# Patient Record
Sex: MALE | Race: ASIAN | NOT HISPANIC OR LATINO
[De-identification: names, ages, dates, MRNs, and addresses within clinical notes are randomized per-mention and may not be internally consistent; named-entity substitution may affect disease eponyms.]

---

## 2017-03-21 ENCOUNTER — APPOINTMENT (OUTPATIENT)
Dept: ORTHOPEDIC SURGERY | Facility: CLINIC | Age: 72
End: 2017-03-21

## 2017-08-01 ENCOUNTER — APPOINTMENT (OUTPATIENT)
Dept: ORTHOPEDIC SURGERY | Facility: CLINIC | Age: 72
End: 2017-08-01
Payer: MEDICARE

## 2017-08-01 PROCEDURE — 99214 OFFICE O/P EST MOD 30 MIN: CPT

## 2017-09-06 ENCOUNTER — OUTPATIENT (OUTPATIENT)
Dept: OUTPATIENT SERVICES | Facility: HOSPITAL | Age: 72
LOS: 1 days | End: 2017-09-06
Payer: MEDICARE

## 2017-09-06 VITALS
TEMPERATURE: 97 F | HEART RATE: 59 BPM | OXYGEN SATURATION: 97 % | WEIGHT: 151.9 LBS | RESPIRATION RATE: 16 BRPM | DIASTOLIC BLOOD PRESSURE: 70 MMHG | SYSTOLIC BLOOD PRESSURE: 120 MMHG | HEIGHT: 62.5 IN

## 2017-09-06 DIAGNOSIS — Z98.890 OTHER SPECIFIED POSTPROCEDURAL STATES: Chronic | ICD-10-CM

## 2017-09-06 DIAGNOSIS — C49.9 MALIGNANT NEOPLASM OF CONNECTIVE AND SOFT TISSUE, UNSPECIFIED: ICD-10-CM

## 2017-09-06 DIAGNOSIS — I10 ESSENTIAL (PRIMARY) HYPERTENSION: ICD-10-CM

## 2017-09-06 LAB
BLD GP AB SCN SERPL QL: NEGATIVE — SIGNIFICANT CHANGE UP
BUN SERPL-MCNC: 17 MG/DL — SIGNIFICANT CHANGE UP (ref 7–23)
CALCIUM SERPL-MCNC: 9.1 MG/DL — SIGNIFICANT CHANGE UP (ref 8.4–10.5)
CHLORIDE SERPL-SCNC: 106 MMOL/L — SIGNIFICANT CHANGE UP (ref 98–107)
CO2 SERPL-SCNC: 26 MMOL/L — SIGNIFICANT CHANGE UP (ref 22–31)
CREAT SERPL-MCNC: 0.85 MG/DL — SIGNIFICANT CHANGE UP (ref 0.5–1.3)
GLUCOSE SERPL-MCNC: 111 MG/DL — HIGH (ref 70–99)
HCT VFR BLD CALC: 41.3 % — SIGNIFICANT CHANGE UP (ref 39–50)
HGB BLD-MCNC: 13.7 G/DL — SIGNIFICANT CHANGE UP (ref 13–17)
MCHC RBC-ENTMCNC: 30.1 PG — SIGNIFICANT CHANGE UP (ref 27–34)
MCHC RBC-ENTMCNC: 33.2 % — SIGNIFICANT CHANGE UP (ref 32–36)
MCV RBC AUTO: 90.8 FL — SIGNIFICANT CHANGE UP (ref 80–100)
NRBC # FLD: 0 — SIGNIFICANT CHANGE UP
PLATELET # BLD AUTO: 188 K/UL — SIGNIFICANT CHANGE UP (ref 150–400)
PMV BLD: 11.2 FL — SIGNIFICANT CHANGE UP (ref 7–13)
POTASSIUM SERPL-MCNC: 3.6 MMOL/L — SIGNIFICANT CHANGE UP (ref 3.5–5.3)
POTASSIUM SERPL-SCNC: 3.6 MMOL/L — SIGNIFICANT CHANGE UP (ref 3.5–5.3)
RBC # BLD: 4.55 M/UL — SIGNIFICANT CHANGE UP (ref 4.2–5.8)
RBC # FLD: 12.6 % — SIGNIFICANT CHANGE UP (ref 10.3–14.5)
RH IG SCN BLD-IMP: POSITIVE — SIGNIFICANT CHANGE UP
SODIUM SERPL-SCNC: 144 MMOL/L — SIGNIFICANT CHANGE UP (ref 135–145)
WBC # BLD: 7.61 K/UL — SIGNIFICANT CHANGE UP (ref 3.8–10.5)
WBC # FLD AUTO: 7.61 K/UL — SIGNIFICANT CHANGE UP (ref 3.8–10.5)

## 2017-09-06 PROCEDURE — 93010 ELECTROCARDIOGRAM REPORT: CPT

## 2017-09-06 RX ORDER — SODIUM CHLORIDE 9 MG/ML
1000 INJECTION, SOLUTION INTRAVENOUS
Qty: 0 | Refills: 0 | Status: DISCONTINUED | OUTPATIENT
Start: 2017-09-13 | End: 2017-09-13

## 2017-09-06 RX ORDER — SODIUM CHLORIDE 9 MG/ML
3 INJECTION INTRAMUSCULAR; INTRAVENOUS; SUBCUTANEOUS EVERY 8 HOURS
Qty: 0 | Refills: 0 | Status: DISCONTINUED | OUTPATIENT
Start: 2017-09-13 | End: 2017-09-13

## 2017-09-06 NOTE — H&P PST ADULT - MUSCULOSKELETAL
No joint pain, swelling or deformity; no limitation of movement details… right knee/decreased ROM due to pain/joint swelling detailed exam

## 2017-09-06 NOTE — H&P PST ADULT - PROBLEM SELECTOR PLAN 1
Scheduled for Right Thigh Mass Resection on 9/13/2017.   Pre op instructions given to daughter, understanding verbalized   Chlorhexidine wash and GI prophylaxis provided  Pt was sen by PCP on 9/5 for medical clearance, note requested

## 2017-09-06 NOTE — H&P PST ADULT - HISTORY OF PRESENT ILLNESS
73 y/o male with PMH of HTN and HLD presents to PST for preoperative evaluation with diagnosis of recurrent low grade fibrosarcoma. As per daughter, patient has undergone 6 surgeries for tumor removal since 1998. Pt reports in June he developed right leg pain. Sent for MRI which revealed recurrence of tumor. Scheduled for Right Thigh Mass Resection on 9/13/2017.

## 2017-09-06 NOTE — H&P PST ADULT - NEGATIVE CARDIOVASCULAR SYMPTOMS
no chest pain/no paroxysmal nocturnal dyspnea/no palpitations/no dyspnea on exertion/no peripheral edema

## 2017-09-06 NOTE — H&P PST ADULT - ATTENDING COMMENTS
as written above  for elective resection right thigh mass  Risks, benefits and alternatives discussed with patient and family  Horace Gil MD  Musculoskeletal Oncology  [phone]

## 2017-09-06 NOTE — H&P PST ADULT - NEGATIVE NEUROLOGICAL SYMPTOMS
no focal seizures/no syncope/no headache/no confusion/no vertigo/no loss of sensation/no difficulty walking/no facial palsy/no hemiparesis/no weakness/no paresthesias/no generalized seizures/no transient paralysis

## 2017-09-06 NOTE — H&P PST ADULT - NEGATIVE GASTROINTESTINAL SYMPTOMS
no abdominal pain/no melena/no vomiting/no diarrhea/no change in bowel habits/no constipation/no nausea

## 2017-09-06 NOTE — H&P PST ADULT - RS GEN PE MLT RESP DETAILS PC
breath sounds equal/no wheezes/airway patent/good air movement/respirations non-labored/clear to auscultation bilaterally/no chest wall tenderness

## 2017-09-06 NOTE — H&P PST ADULT - PMH
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

## 2017-09-06 NOTE — H&P PST ADULT - NSANTHOSAYNRD_GEN_A_CORE
No. JAD screening performed.  STOP BANG Legend: 0-2 = LOW Risk; 3-4 = INTERMEDIATE Risk; 5-8 = HIGH Risk

## 2017-09-13 ENCOUNTER — RESULT REVIEW (OUTPATIENT)
Age: 72
End: 2017-09-13

## 2017-09-13 ENCOUNTER — OUTPATIENT (OUTPATIENT)
Dept: OUTPATIENT SERVICES | Facility: HOSPITAL | Age: 72
LOS: 1 days | Discharge: HOME | End: 2017-09-13
Payer: MEDICARE

## 2017-09-13 ENCOUNTER — APPOINTMENT (OUTPATIENT)
Dept: ORTHOPEDIC SURGERY | Facility: HOSPITAL | Age: 72
End: 2017-09-13

## 2017-09-13 VITALS
SYSTOLIC BLOOD PRESSURE: 154 MMHG | HEIGHT: 62.5 IN | HEART RATE: 58 BPM | TEMPERATURE: 97 F | WEIGHT: 151.9 LBS | RESPIRATION RATE: 15 BRPM | DIASTOLIC BLOOD PRESSURE: 83 MMHG | OXYGEN SATURATION: 98 %

## 2017-09-13 VITALS
HEART RATE: 64 BPM | SYSTOLIC BLOOD PRESSURE: 144 MMHG | OXYGEN SATURATION: 100 % | RESPIRATION RATE: 16 BRPM | DIASTOLIC BLOOD PRESSURE: 71 MMHG

## 2017-09-13 DIAGNOSIS — Z98.890 OTHER SPECIFIED POSTPROCEDURAL STATES: Chronic | ICD-10-CM

## 2017-09-13 DIAGNOSIS — C49.9 MALIGNANT NEOPLASM OF CONNECTIVE AND SOFT TISSUE, UNSPECIFIED: ICD-10-CM

## 2017-09-13 LAB — RH IG SCN BLD-IMP: POSITIVE — SIGNIFICANT CHANGE UP

## 2017-09-13 PROCEDURE — 88342 IMHCHEM/IMCYTCHM 1ST ANTB: CPT | Mod: 26

## 2017-09-13 PROCEDURE — 27364 RESECT THIGH/KNEE TUM 5 CM/>: CPT

## 2017-09-13 PROCEDURE — 88305 TISSUE EXAM BY PATHOLOGIST: CPT | Mod: 26

## 2017-09-13 PROCEDURE — 97605 NEG PRS WND THER DME<=50SQCM: CPT

## 2017-09-13 RX ORDER — VALSARTAN 80 MG/1
1 TABLET ORAL
Qty: 0 | Refills: 0 | COMMUNITY

## 2017-09-13 RX ORDER — ERGOCALCIFEROL 1.25 MG/1
1 CAPSULE ORAL
Qty: 0 | Refills: 0 | COMMUNITY

## 2017-09-13 RX ORDER — METOPROLOL TARTRATE 50 MG
1 TABLET ORAL
Qty: 0 | Refills: 0 | COMMUNITY

## 2017-09-13 RX ORDER — SENNA PLUS 8.6 MG/1
2 TABLET ORAL
Qty: 0 | Refills: 0 | COMMUNITY

## 2017-09-13 RX ORDER — FINASTERIDE 5 MG/1
1 TABLET, FILM COATED ORAL
Qty: 0 | Refills: 0 | COMMUNITY

## 2017-09-13 RX ORDER — SENNA PLUS 8.6 MG/1
1 TABLET ORAL
Qty: 0 | Refills: 0 | COMMUNITY

## 2017-09-13 RX ORDER — SODIUM CHLORIDE 9 MG/ML
1000 INJECTION, SOLUTION INTRAVENOUS
Qty: 0 | Refills: 0 | Status: DISCONTINUED | OUTPATIENT
Start: 2017-09-13 | End: 2017-09-28

## 2017-09-13 NOTE — ASU DISCHARGE PLAN (ADULT/PEDIATRIC). - NURSING INSTRUCTIONS
Do not take pain medication on an empty stomach.  Increase fluids and fiber in diet to prevent constipation. Do not take pain medication on an empty stomach.  Increase fluids and fiber in diet to prevent constipation.  You were given Tylenol in the OR.  Do not take Tylenol or products containing acetaminophen until 2:20pm.

## 2017-09-13 NOTE — ASU DISCHARGE PLAN (ADULT/PEDIATRIC). - POST OP PHONE #
pt. granted permission to leave message /and or speak with whoever answers the phone. [phone] daughter

## 2017-09-13 NOTE — ASU PREOP CHECKLIST - 1.
Mandarin interpeter [id number]   patient stated granddaughter / daughtyer may intepret Cecy Mandarin interpeter [id number]   patient stated granddaughter / dajose Soriano interpret

## 2017-09-13 NOTE — ASU DISCHARGE PLAN (ADULT/PEDIATRIC). - ACTIVITY LEVEL
no sports/gym/elevate extremity/no heavy lifting/weight bearing as tolerated elevate extremity/no sports/gym/weight bearing as tolerated/no heavy lifting/no tub baths

## 2017-09-13 NOTE — ASU DISCHARGE PLAN (ADULT/PEDIATRIC). - NOTIFY
Bleeding that does not stop/Pain not relieved by Medications/Numbness, color, or temperature change to extremity/Swelling that continues Numbness, color, or temperature change to extremity/Pain not relieved by Medications/Bleeding that does not stop/Fever greater than 101/Swelling that continues

## 2017-09-13 NOTE — ASU DISCHARGE PLAN (ADULT/PEDIATRIC). - MEDICATION SUMMARY - MEDICATIONS TO TAKE
I will START or STAY ON the medications listed below when I get home from the hospital:    finasteride 5 mg oral tablet  -- 1 tab(s) by mouth once a day (at bedtime)  -- Indication: For per pmd    oxyCODONE-acetaminophen 5 mg-325 mg oral tablet  -- 1 tab(s) by mouth every 6 hours MDD:6 As Needed Pain  -- Caution federal law prohibits the transfer of this drug to any person other  than the person for whom it was prescribed.  May cause drowsiness.  Alcohol may intensify this effect.  Use care when operating dangerous machinery.  This prescription cannot be refilled.  This product contains acetaminophen.  Do not use  with any other product containing acetaminophen to prevent possible liver damage.  Using more of this medication than prescribed may cause serious breathing problems.    -- Indication: For pain    valsartan 320 mg oral tablet  -- 1 tab(s) by mouth once a day (at bedtime)  -- Indication: For per pmd    atorvastatin 40 mg oral tablet  -- 1 tab(s) by mouth once a day in morning  -- Indication: For per pmd    metoprolol succinate 50 mg oral tablet, extended release  -- 1 tab(s) by mouth once a day in morning  -- Indication: For per pmd    senna oral tablet  --  2 tabs by mouth every other day at bedtime alternating with 1 tablet every other day at bedtime  -- Indication: For per pmd    senna oral tablet  -- 1 tab(s) by mouth every other day at bedtime alternating with 2 tablets every other day at bedtime  -- Indication: For per pmd    esomeprazole 40 mg oral delayed release capsule  -- 1 cap(s) by mouth once a day (at bedtime)  -- Indication: For per pdm    Calcium 600+D oral tablet  -- 1 tab(s) by mouth 2 times a day  -- Indication: For per pmd    Vitamin D2 50,000 intl units (1.25 mg) oral capsule  -- 1 cap(s) by mouth once a week on Wednesday morning  -- Indication: For per pmd

## 2017-09-14 ENCOUNTER — TRANSCRIPTION ENCOUNTER (OUTPATIENT)
Age: 72
End: 2017-09-14

## 2017-09-19 ENCOUNTER — APPOINTMENT (OUTPATIENT)
Dept: ORTHOPEDIC SURGERY | Facility: CLINIC | Age: 72
End: 2017-09-19
Payer: MEDICARE

## 2017-09-19 PROCEDURE — 99024 POSTOP FOLLOW-UP VISIT: CPT

## 2017-10-03 ENCOUNTER — APPOINTMENT (OUTPATIENT)
Dept: ORTHOPEDIC SURGERY | Facility: CLINIC | Age: 72
End: 2017-10-03
Payer: MEDICARE

## 2017-10-03 PROCEDURE — 99024 POSTOP FOLLOW-UP VISIT: CPT

## 2019-05-04 NOTE — H&P PST ADULT - NSANTHTIREDRD_ENT_A_CORE
Please call your hematologist/oncologist for a refill on your oxycodone.    Our goal in the emergency department is to always give you outstanding care and exceptional service. You may receive a survey by mail or e-mail in the next week regarding your experience in our ED. We would greatly appreciate your completing and returning the survey. Your feedback provides us with a way to recognize our staff who give very good care and it helps us learn how to improve when your experience was below our aspiration of excellence.    No

## 2019-08-08 NOTE — ASU PREOP CHECKLIST - HEIGHT IN CM
158.75
PAST SURGICAL HISTORY:  Bilateral cataracts     History of left knee replacement     History of partial hysterectomy     History of surgery bladder lift    History of tonsillectomy     S/P insertion of spinal cord stimulator

## 2021-12-09 NOTE — ASU PREOP CHECKLIST - SPO2 (%)
Impression: Combined forms of age-related cataract, bilateral: H25.813. Plan: Observe for now without intervention. The patient was advised to contact us if any change or worsening of vision. 98

## 2023-04-26 PROBLEM — E78.5 HYPERLIPIDEMIA, UNSPECIFIED: Chronic | Status: ACTIVE | Noted: 2017-09-06

## 2023-04-26 PROBLEM — I10 ESSENTIAL (PRIMARY) HYPERTENSION: Chronic | Status: ACTIVE | Noted: 2017-09-06

## 2023-04-26 PROBLEM — C49.9 MALIGNANT NEOPLASM OF CONNECTIVE AND SOFT TISSUE, UNSPECIFIED: Chronic | Status: ACTIVE | Noted: 2017-09-06

## 2023-05-09 ENCOUNTER — APPOINTMENT (OUTPATIENT)
Dept: ORTHOPEDIC SURGERY | Facility: CLINIC | Age: 78
End: 2023-05-09
Payer: MEDICARE

## 2023-05-09 DIAGNOSIS — M79.89 OTHER SPECIFIED SOFT TISSUE DISORDERS: ICD-10-CM

## 2023-05-09 PROCEDURE — 99213 OFFICE O/P EST LOW 20 MIN: CPT

## 2023-05-12 VITALS
HEIGHT: 62.99 IN | OXYGEN SATURATION: 95 % | TEMPERATURE: 98 F | SYSTOLIC BLOOD PRESSURE: 142 MMHG | WEIGHT: 149.91 LBS | HEART RATE: 72 BPM | RESPIRATION RATE: 16 BRPM | DIASTOLIC BLOOD PRESSURE: 66 MMHG

## 2023-05-12 PROBLEM — M79.89 MASS OF SOFT TISSUE OF THIGH: Status: ACTIVE | Noted: 2023-05-12

## 2023-05-12 NOTE — H&P ADULT - REASON FOR ADMISSION
cardiac cath

## 2023-05-12 NOTE — H&P ADULT - NSICDXPASTSURGICALHX_GEN_ALL_CORE_FT
PAST SURGICAL HISTORY:  H/O inguinal hernia repair right    S/P skin neoplasm resection of right thigh mass

## 2023-05-12 NOTE — H&P ADULT - NSHPLABSRESULTS_GEN_ALL_CORE
13.7   8.23  )-----------( 190      ( 22 May 2023 13:15 )             40.5       05-22    142  |  109<H>  |  20  ----------------------------<  81  3.6   |  23  |  0.90    Ca    8.7      22 May 2023 13:25    TPro  6.6  /  Alb  4.0  /  TBili  0.4  /  DBili  x   /  AST  12  /  ALT  9<L>  /  AlkPhos  66  05-22      PT/INR - ( 22 May 2023 13:15 )   PT: 11.4 sec;   INR: 0.96          PTT - ( 22 May 2023 13:15 )  PTT:30.1 sec    CARDIAC MARKERS ( 22 May 2023 13:25 )  x     / 0.01 ng/mL / 38 U/L / x     / <1.0 ng/mL            EKG: NSR, Frequent PVC's noted.

## 2023-05-12 NOTE — H&P ADULT - HISTORY OF PRESENT ILLNESS
Cardiologist: Dr. 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 Martel c/o gradually worsen intermittent substernal chest pressure, non-radiating with moderate physical exertion and relieved with rest. Denies CP, SOB, orthopnea/PND, LOC/dizziness, fever/chills, n/v/d, abdominal pain, LE edema. NST 3/13/2023: small perfusion abnormality of mild intensity is present in the inferior region. TTE 2/17/2023: EF 59%, mild LVH, aortic valve scleorsis with mild calcifications, mod AI, mild MR & TR.     In light of pt's risk factor, CCS angina class III symptoms, and abnormal NST result, pt was referred to the cardiac cath with possible intervention if clinically indicated.  Cardiologist: Dr. 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 Martel c/o gradually worsen intermittent substernal chest pressure, non-radiating with moderate physical exertion and relieved with rest. Denies CP, SOB, orthopnea/PND, LOC/dizziness, fever/chills, n/v/d, abdominal pain, LE edema. NST 3/13/2023: small perfusion abnormality of mild intensity is present in the inferior region. TTE 2/17/2023: EF 59%, mild LVH, aortic valve scleorsis with mild calcifications, mod AI, mild MR & TR.     In light of pt's risk factor, CCS angina class III symptoms, and abnormal NST result, pt was referred to the cardiac cath with possible intervention if clinically indicated.  Cardiologist: Dr. 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 Martel c/o gradually worsen intermittent substernal chest pressure, non-radiating with moderate physical exertion and relieved with rest. Denies CP, SOB, orthopnea/PND, LOC/dizziness, fever/chills, n/v/d, abdominal pain, LE edema. NST 3/13/2023: small perfusion abnormality of mild intensity is present in the inferior region. TTE 2/17/2023: EF 59%, mild LVH, aortic valve sclerosis with mild calcifications, mod AI, mild MR & TR.     In light of pt's risk factor, CCS angina class III symptoms, and abnormal NST result, pt was referred to the cardiac cath with possible intervention if clinically indicated.     OF NOTE: pt unaware of any meds. Called [facility] (name given by patient) who reported pt only on Senna 8.6mg qd, aspirin 81mg qd, Ranexa 500mg bid, Colase 100mg tid. Called second pharmacy [facility] in chart who reported all his other meds but not filled since 2017. Patient however did report he takes HTN meds. Will need to clarify if he uses any other pharmacy in addition.

## 2023-05-12 NOTE — HISTORY OF PRESENT ILLNESS
[FreeTextEntry1] : I have not seen the patient in over 5 years.  He has been feeling a mass and on imaging was found to have a new lesion.  He did not follow-up with me but has been following up with oncology.  He had a biopsy which shows recurrent disease.  He does not have any pain.  He is able to move around appropriately. [Stable] : stable [1] : currently ~his/her~ pain is 1 out of 10

## 2023-05-12 NOTE — H&P ADULT - ASSESSMENT
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 Martel c/o gradually worsen intermittent substernal chest pressure, non-radiating with moderate physical exertion and relieved with rest. Denies CP, SOB, orthopnea/PND, LOC/dizziness, fever/chills, n/v/d, abdominal pain, LE edema. NST 3/13/2023: small perfusion abnormality of mild intensity is present in the inferior region. TTE 2/17/2023: EF 59%, mild LVH, aortic valve sclerosis with mild calcifications, mod AI, mild MR & TR. Patient now presents for cardiac cath with possible intervention if clinically indicated.  in light of pt's risk factor, CCS angina class III symptoms, and abnormal NST result.    OF NOTE: pt unaware of any meds. Called "[facility] pharmacy (name given by patient) who reported pt only on Senna 8.6mg qd, aspirin 81mg qd, Ranexa 500mg bid, Colase 100mg tid. Called second pharmacy [facility] in chart who reported all his other meds but not filled since 2017. Patient however did report he takes HTN meds. Will need to clarify if he uses any other pharmacy in addition.     -Pt H+H, platelets stable, Pt without any reports of BRBPR, hematuria, prior ICH, melena and no recent or previous GI bleed.   -Loaded with: [ ]81mg ASA, [ ]75mg Plavix,  [ ] ASA 325mg [ ] Plavix 600mg, [x ] No Load [ ]. due to patient expecting potential recurrent femur cancer surgery on June 13th 2023.  -Pt Cr. stable- and LVEF 50%, pre cath fluids ordered per protocol [x]250ml IV bolus over 30 min, x[ ] 75cc x2hrs, [ ] 50cc x2hrs, Patient euvolemic on exam.   -Malampatti II  -ASA III    Pt is a Candidate for Moderate Sedation, yes     was used for language Mandarin ID [id number].     Risks & benefits of procedure and alternative therapy have been explained to the patient including but not limited to: allergic reaction, bleeding w/possible need for blood transfusion, infection, renal and vascular compromise, limb damage, arrhythmia, stroke, vessel dissection/perforation, Myocardial infarction, emergent CABG. Informed consent obtained and in chart

## 2023-05-12 NOTE — DATA REVIEWED
[Imaging Present] : Present [de-identified] : MRI of the knee April 5, 2023 shows recurrent tumor in the vastus medialis.  There is also questionable lesion in the distal femur.  I looked back on previous MRIs and the distal femur changes look the same as before and are consistent most likely with radiation changes.  He has an enlarged right inguinal lymph node and degenerative changes of the patellofemoral joint with a nondisplaced horizontal tear of the posterior horn the lateral meniscus\par \par Pathology from biopsy on April 26, 2023 shows recurrent low-grade myofibroblastic sarcoma\par \par PET/CT from April 28, 2023 shows soft tissue mass along the medial aspect of the distal right side 3.8 x 2.5 cm and additional focal area abutting the cortex of the right mid femur at most 1.7 cm

## 2023-05-12 NOTE — REASON FOR VISIT
[FreeTextEntry1] : 9/13/2017 - reresection of recurrent right thigh fibrosarcoma. \par 10/22/2012 - resection of recurrentfibrosarcoma \par Followup of recurrent fibrosarcoma

## 2023-05-12 NOTE — PHYSICAL EXAM
[FreeTextEntry1] : Unchanged from before. Scars on both the lateral and medial side of the RIGHT thigh. He has no new masses. There is good flexion and extension. [General Appearance - Well-Appearing] : Well appearing [General Appearance - Well Nourished] : well nourished [Sclera] : the sclera and conjunctiva were normal [Neck Cervical Mass (___cm)] : no neck mass was observed [Heart Rate And Rhythm] : heart rate was normal and rhythm regular [] : No respiratory distress [Abdomen Tenderness] : non-tender [Limping On The Right] : limping on the right [Normal Bilat. UE ROM:] : Full range of motion throughout the bilateral upper extremities. [Normal Bilat. UE Motor Strength] : 5/5 strength in bilateral EPL,FPL, EDC, FDS, FDP, HI, wrist extension, wrist flexion, supination, pronation, biceps, triceps and deltoids [Tenderness] : tenderness [Swelling] : no swelling [Erythema] : no erythema [Ecchymosis] : no ecchymosis [Masses] : no masses [Skin Changes - Describe changes:] : Skin changes noted: ~M [Incision Healed - Describe:] : Incision is healed ~M [Normal] : Sensation intact to light touch. [FreeTextEntry3] : Radiation-induced [FreeTextEntry5] : No inguinal nodes

## 2023-05-12 NOTE — DISCUSSION/SUMMARY
[All Questions Answered] : Patient (and family) had all questions answered to an agreeable level of satisfaction [Interested in Proceeding] : Patient (and family) expressed understanding and interest in proceeding with the plan as outlined [de-identified] : Patient's lesion is in the area of the old resection bed.  It is approximately 5 cm.  My recommendation would be for a wide resection of this once again.  We will also investigate the area near the femur.  At the same time we can consider doing a lymph node resection as well.  If this should be positive we would need to consider further work-up.  He understands the risk because of previous surgeries as well as radiation but there are cosmetic risks as well as wound healing risks.  We will likely put in a drain and a VAC type dressing.  I will see him for surgery in the near future.\par \par If imaging was ordered, the patient was told to make an appointment to review findings right after all imaging is completed.\par \par We discussed risks, benefits and alternatives. Rationale of care was reviewed and all questions were answered. Patient (and family) had all questions answered to her degree of the level of satisfaction. Patient (and family) expressed understanding and interest in proceeding with the plan as outlined.\par \par \par \par \par This note was done with a voice recognition transcription software and any typos are related to this rather than medical error. Surgical risks reviewed. Patient (and family) had all questions answered to an agreeable level of satisfaction. Patient (and family) expressed understanding and interest in proceeding with the plan as outlined.  \par

## 2023-05-12 NOTE — H&P ADULT - NSICDXPASTMEDICALHX_GEN_ALL_CORE_FT
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

## 2023-05-22 ENCOUNTER — OUTPATIENT (OUTPATIENT)
Dept: OUTPATIENT SERVICES | Facility: HOSPITAL | Age: 78
LOS: 1 days | Discharge: HOME | End: 2023-05-22
Payer: MEDICARE

## 2023-05-22 DIAGNOSIS — Z98.890 OTHER SPECIFIED POSTPROCEDURAL STATES: Chronic | ICD-10-CM

## 2023-05-22 LAB
A1C WITH ESTIMATED AVERAGE GLUCOSE RESULT: 5.2 % — SIGNIFICANT CHANGE UP (ref 4–5.6)
ALBUMIN SERPL ELPH-MCNC: 4 G/DL — SIGNIFICANT CHANGE UP (ref 3.3–5)
ALP SERPL-CCNC: 66 U/L — SIGNIFICANT CHANGE UP (ref 40–120)
ALT FLD-CCNC: 9 U/L — LOW (ref 10–45)
ANION GAP SERPL CALC-SCNC: 10 MMOL/L — SIGNIFICANT CHANGE UP (ref 5–17)
APTT BLD: 30.1 SEC — SIGNIFICANT CHANGE UP (ref 27.5–35.5)
AST SERPL-CCNC: 12 U/L — SIGNIFICANT CHANGE UP (ref 10–40)
BASE EXCESS BLDV CALC-SCNC: 2.4 MMOL/L — SIGNIFICANT CHANGE UP (ref -2–3)
BASOPHILS # BLD AUTO: 0.06 K/UL — SIGNIFICANT CHANGE UP (ref 0–0.2)
BASOPHILS NFR BLD AUTO: 0.7 % — SIGNIFICANT CHANGE UP (ref 0–2)
BILIRUB SERPL-MCNC: 0.4 MG/DL — SIGNIFICANT CHANGE UP (ref 0.2–1.2)
BUN SERPL-MCNC: 20 MG/DL — SIGNIFICANT CHANGE UP (ref 7–23)
CA-I SERPL-SCNC: 1.16 MMOL/L — SIGNIFICANT CHANGE UP (ref 1.15–1.33)
CALCIUM SERPL-MCNC: 8.7 MG/DL — SIGNIFICANT CHANGE UP (ref 8.4–10.5)
CHLORIDE SERPL-SCNC: 109 MMOL/L — HIGH (ref 96–108)
CHOLEST SERPL-MCNC: 233 MG/DL — HIGH
CK MB CFR SERPL CALC: <1 NG/ML — SIGNIFICANT CHANGE UP (ref 0–6.7)
CK SERPL-CCNC: 38 U/L — SIGNIFICANT CHANGE UP (ref 30–200)
CO2 BLDV-SCNC: 28.5 MMOL/L — HIGH (ref 22–26)
CO2 SERPL-SCNC: 23 MMOL/L — SIGNIFICANT CHANGE UP (ref 22–31)
COHGB MFR BLDV: 1.9 % — SIGNIFICANT CHANGE UP
CREAT SERPL-MCNC: 0.9 MG/DL — SIGNIFICANT CHANGE UP (ref 0.5–1.3)
EGFR: 86 ML/MIN/1.73M2 — SIGNIFICANT CHANGE UP
EOSINOPHIL # BLD AUTO: 0.24 K/UL — SIGNIFICANT CHANGE UP (ref 0–0.5)
EOSINOPHIL NFR BLD AUTO: 2.9 % — SIGNIFICANT CHANGE UP (ref 0–6)
ESTIMATED AVERAGE GLUCOSE: 103 MG/DL — SIGNIFICANT CHANGE UP (ref 68–114)
GAS PNL BLDV: 140 MMOL/L — SIGNIFICANT CHANGE UP (ref 136–145)
GLUCOSE BLDV-MCNC: 78 MG/DL — SIGNIFICANT CHANGE UP (ref 70–99)
GLUCOSE SERPL-MCNC: 81 MG/DL — SIGNIFICANT CHANGE UP (ref 70–99)
HCO3 BLDV-SCNC: 27 MMOL/L — SIGNIFICANT CHANGE UP (ref 22–29)
HCT VFR BLD CALC: 40.5 % — SIGNIFICANT CHANGE UP (ref 39–50)
HCT VFR BLDA CALC: 41 % — SIGNIFICANT CHANGE UP
HDLC SERPL-MCNC: 28 MG/DL — LOW
HGB BLD CALC-MCNC: 13.6 G/DL — SIGNIFICANT CHANGE UP (ref 12.6–17.4)
HGB BLD-MCNC: 13.7 G/DL — SIGNIFICANT CHANGE UP (ref 13–17)
IMM GRANULOCYTES NFR BLD AUTO: 0.7 % — SIGNIFICANT CHANGE UP (ref 0–0.9)
INR BLD: 0.96 — SIGNIFICANT CHANGE UP (ref 0.88–1.16)
LIPID PNL WITH DIRECT LDL SERPL: 152 MG/DL — HIGH
LYMPHOCYTES # BLD AUTO: 2.31 K/UL — SIGNIFICANT CHANGE UP (ref 1–3.3)
LYMPHOCYTES # BLD AUTO: 28.1 % — SIGNIFICANT CHANGE UP (ref 13–44)
MAGNESIUM SERPL-MCNC: 2.3 MG/DL — SIGNIFICANT CHANGE UP (ref 1.6–2.6)
MCHC RBC-ENTMCNC: 30.1 PG — SIGNIFICANT CHANGE UP (ref 27–34)
MCHC RBC-ENTMCNC: 33.8 GM/DL — SIGNIFICANT CHANGE UP (ref 32–36)
MCV RBC AUTO: 89 FL — SIGNIFICANT CHANGE UP (ref 80–100)
METHGB MFR BLDV: 0.8 % — SIGNIFICANT CHANGE UP
MONOCYTES # BLD AUTO: 0.8 K/UL — SIGNIFICANT CHANGE UP (ref 0–0.9)
MONOCYTES NFR BLD AUTO: 9.7 % — SIGNIFICANT CHANGE UP (ref 2–14)
NEUTROPHILS # BLD AUTO: 4.76 K/UL — SIGNIFICANT CHANGE UP (ref 1.8–7.4)
NEUTROPHILS NFR BLD AUTO: 57.9 % — SIGNIFICANT CHANGE UP (ref 43–77)
NON HDL CHOLESTEROL: 205 MG/DL — HIGH
NRBC # BLD: 0 /100 WBCS — SIGNIFICANT CHANGE UP (ref 0–0)
PCO2 BLDV: 42 MMHG — SIGNIFICANT CHANGE UP (ref 42–55)
PH BLDV: 7.42 — SIGNIFICANT CHANGE UP (ref 7.32–7.43)
PLATELET # BLD AUTO: 190 K/UL — SIGNIFICANT CHANGE UP (ref 150–400)
PO2 BLDV: 61 MMHG — HIGH (ref 25–45)
POTASSIUM BLDV-SCNC: 3.6 MMOL/L — SIGNIFICANT CHANGE UP (ref 3.5–5.1)
POTASSIUM SERPL-MCNC: 3.6 MMOL/L — SIGNIFICANT CHANGE UP (ref 3.5–5.3)
POTASSIUM SERPL-SCNC: 3.6 MMOL/L — SIGNIFICANT CHANGE UP (ref 3.5–5.3)
PROT SERPL-MCNC: 6.6 G/DL — SIGNIFICANT CHANGE UP (ref 6–8.3)
PROTHROM AB SERPL-ACNC: 11.4 SEC — SIGNIFICANT CHANGE UP (ref 10.5–13.4)
RBC # BLD: 4.55 M/UL — SIGNIFICANT CHANGE UP (ref 4.2–5.8)
RBC # FLD: 13.2 % — SIGNIFICANT CHANGE UP (ref 10.3–14.5)
SAO2 % BLDV: 92 % — HIGH (ref 67–88)
SODIUM SERPL-SCNC: 142 MMOL/L — SIGNIFICANT CHANGE UP (ref 135–145)
TRIGL SERPL-MCNC: 263 MG/DL — HIGH
TROPONIN T SERPL-MCNC: 0.01 NG/ML — SIGNIFICANT CHANGE UP (ref 0–0.01)
WBC # BLD: 8.23 K/UL — SIGNIFICANT CHANGE UP (ref 3.8–10.5)
WBC # FLD AUTO: 8.23 K/UL — SIGNIFICANT CHANGE UP (ref 3.8–10.5)

## 2023-05-22 PROCEDURE — 82550 ASSAY OF CK (CPK): CPT

## 2023-05-22 PROCEDURE — 85610 PROTHROMBIN TIME: CPT

## 2023-05-22 PROCEDURE — C1894: CPT

## 2023-05-22 PROCEDURE — 82553 CREATINE MB FRACTION: CPT

## 2023-05-22 PROCEDURE — 36415 COLL VENOUS BLD VENIPUNCTURE: CPT

## 2023-05-22 PROCEDURE — 83036 HEMOGLOBIN GLYCOSYLATED A1C: CPT

## 2023-05-22 PROCEDURE — 99152 MOD SED SAME PHYS/QHP 5/>YRS: CPT

## 2023-05-22 PROCEDURE — 82803 BLOOD GASES ANY COMBINATION: CPT

## 2023-05-22 PROCEDURE — 84484 ASSAY OF TROPONIN QUANT: CPT

## 2023-05-22 PROCEDURE — 82565 ASSAY OF CREATININE: CPT

## 2023-05-22 PROCEDURE — 80053 COMPREHEN METABOLIC PANEL: CPT

## 2023-05-22 PROCEDURE — 85730 THROMBOPLASTIN TIME PARTIAL: CPT

## 2023-05-22 PROCEDURE — 85025 COMPLETE CBC W/AUTO DIFF WBC: CPT

## 2023-05-22 PROCEDURE — 93458 L HRT ARTERY/VENTRICLE ANGIO: CPT

## 2023-05-22 PROCEDURE — C1769: CPT

## 2023-05-22 PROCEDURE — 93458 L HRT ARTERY/VENTRICLE ANGIO: CPT | Mod: 26

## 2023-05-22 PROCEDURE — C1887: CPT

## 2023-05-22 PROCEDURE — 80061 LIPID PANEL: CPT

## 2023-05-22 PROCEDURE — 83735 ASSAY OF MAGNESIUM: CPT

## 2023-05-22 RX ORDER — ESOMEPRAZOLE MAGNESIUM 40 MG/1
1 CAPSULE, DELAYED RELEASE ORAL
Qty: 0 | Refills: 0 | DISCHARGE

## 2023-05-22 RX ORDER — ATORVASTATIN CALCIUM 80 MG/1
1 TABLET, FILM COATED ORAL
Qty: 0 | Refills: 0 | DISCHARGE

## 2023-05-22 RX ORDER — POTASSIUM CHLORIDE 20 MEQ
40 PACKET (EA) ORAL ONCE
Refills: 0 | Status: COMPLETED | OUTPATIENT
Start: 2023-05-22 | End: 2023-05-22

## 2023-05-22 RX ORDER — SODIUM CHLORIDE 9 MG/ML
1000 INJECTION INTRAMUSCULAR; INTRAVENOUS; SUBCUTANEOUS
Refills: 0 | Status: DISCONTINUED | OUTPATIENT
Start: 2023-05-22 | End: 2023-06-05

## 2023-05-22 RX ORDER — CHLORHEXIDINE GLUCONATE 213 G/1000ML
1 SOLUTION TOPICAL ONCE
Refills: 0 | Status: DISCONTINUED | OUTPATIENT
Start: 2023-05-22 | End: 2023-06-05

## 2023-05-22 RX ORDER — SODIUM CHLORIDE 9 MG/ML
500 INJECTION INTRAMUSCULAR; INTRAVENOUS; SUBCUTANEOUS
Refills: 0 | Status: DISCONTINUED | OUTPATIENT
Start: 2023-05-22 | End: 2023-05-22

## 2023-05-22 RX ORDER — SODIUM CHLORIDE 9 MG/ML
250 INJECTION INTRAMUSCULAR; INTRAVENOUS; SUBCUTANEOUS ONCE
Refills: 0 | Status: DISCONTINUED | OUTPATIENT
Start: 2023-05-22 | End: 2023-05-22

## 2023-05-22 RX ADMIN — SODIUM CHLORIDE 200 MILLILITER(S): 9 INJECTION INTRAMUSCULAR; INTRAVENOUS; SUBCUTANEOUS at 15:05

## 2023-05-22 RX ADMIN — Medication 40 MILLIEQUIVALENT(S): at 15:30

## 2023-05-22 NOTE — PROGRESS NOTE ADULT - SUBJECTIVE AND OBJECTIVE BOX
Interventional Cardiology PA SDA Discharge Note    Patient without complaints. 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 (now w/ recurrence needing surgery 6/13/23), who presented to his cardiologist's office, Dr. Martel c/o gradually worsen intermittent substernal chest pressure, non-radiating with moderate physical exertion and relieved with rest. Denies CP, SOB, orthopnea/PND, LOC/dizziness, fever/chills, n/v/d, abdominal pain, LE edema. NST 3/13/2023: small perfusion abnormality of mild intensity is present in the inferior region. TTE 2/17/2023: EF 59%, mild LVH, aortic valve sclerosis with mild calcifications, mod AI, mild MR & TR. Patient now presents for cardiac cath with possible intervention if clinically indicated.  in light of pt's risk factor, CCS angina class III symptoms, and abnormal NST result.      s/p cardiac cath 5/22/23 RRA access: LM - nl, pLAD 50% Calcified, LCx mild LI, mRCA 60-70%, RPDA 60-70%  EDP 8mmHg    for medical management, return for possible staged PCI mRCA and RPDA if still symptomatic       1.	Stable for discharge as per attending Dr. Gomes  after bed rest, wrist stable and 30 minutes of ambulation.  2.	Follow-up with PMD/Cardiologist Dr Martel  in 1-2 weeks  3.	Discharged forms signed and copies in chart

## 2023-05-23 LAB
ISTAT INR: 1.1 — SIGNIFICANT CHANGE UP (ref 0.88–1.16)
ISTAT PT: 13.4 SEC — HIGH (ref 10–12.9)
POCT ISTAT CREATININE: 0.9 MG/DL — SIGNIFICANT CHANGE UP (ref 0.5–1.3)

## 2023-05-26 DIAGNOSIS — I25.110 ATHEROSCLEROTIC HEART DISEASE OF NATIVE CORONARY ARTERY WITH UNSTABLE ANGINA PECTORIS: ICD-10-CM

## 2023-05-26 DIAGNOSIS — R94.39 ABNORMAL RESULT OF OTHER CARDIOVASCULAR FUNCTION STUDY: ICD-10-CM

## 2023-05-26 DIAGNOSIS — I25.84 CORONARY ATHEROSCLEROSIS DUE TO CALCIFIED CORONARY LESION: ICD-10-CM

## 2023-06-13 ENCOUNTER — APPOINTMENT (OUTPATIENT)
Dept: ORTHOPEDIC SURGERY | Facility: AMBULATORY MEDICAL SERVICES | Age: 78
End: 2023-06-13
Payer: MEDICARE

## 2023-06-13 PROCEDURE — 14301 TIS TRNFR ANY 30.1-60 SQ CM: CPT | Mod: RT

## 2023-06-13 PROCEDURE — 35703 EXPL N/FLWD SURG LXTR ART: CPT | Mod: RT

## 2023-06-13 PROCEDURE — 27364 RESECT THIGH/KNEE TUM 5 CM/>: CPT | Mod: RT

## 2023-06-13 PROCEDURE — 14302 TIS TRNFR ADDL 30 SQ CM: CPT | Mod: RT

## 2023-06-27 ENCOUNTER — APPOINTMENT (OUTPATIENT)
Dept: ORTHOPEDIC SURGERY | Facility: CLINIC | Age: 78
End: 2023-06-27
Payer: MEDICARE

## 2023-06-27 PROCEDURE — 99024 POSTOP FOLLOW-UP VISIT: CPT

## 2023-06-27 NOTE — HISTORY OF PRESENT ILLNESS
[Chills] : no chills [Fever] : no fever [Healed] : not healed [Dehiscence] : not dehisced [de-identified] : 6/13/2023 - reresection of straight anterior straight medial thigh fibrosarcoma 9/13/2017 - Postoperative resection of recurrent right thigh fibrosarcoma [de-identified] : Patient is having continued pain and swelling.  He has no erythema and no fevers.  He has difficulty walking but uses a walker at baseline.  He was in the emergency room and was ruled out for any DVT. [de-identified] : His wounds are still healing.  He is able to move his knee but is weaker in the quad with 4 out of 5 strength because because of the resection through the quad.  He does not report any loss of sensation but feels that his knee is swollen and painful. 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  Both of the areas had tumor.  The one anteriorly along the periosteum was very difficult to pin down and had significant positive margins.  The medial one also had positive margins. [de-identified] : Followup 6 weeks for continued evaluation and wound check. He will follow up with his medical oncologist.\par \par We discussed risks, benefits and alternatives. Rationale of care was reviewed and all questions were answered. [de-identified] : As before it is likely that he will have a another recurrence at some point as he has residual tumor.  I discussed with the family that\par \par \par The future he may benefit from amputation as this is the only way to get any sort of local control.  They will discuss this with their oncologist.  I am not suggesting any amputation now however should it be necessary in the future.

## 2023-08-08 ENCOUNTER — APPOINTMENT (OUTPATIENT)
Dept: ORTHOPEDIC SURGERY | Facility: CLINIC | Age: 78
End: 2023-08-08
Payer: MEDICARE

## 2023-08-08 PROCEDURE — 99024 POSTOP FOLLOW-UP VISIT: CPT

## 2023-11-21 ENCOUNTER — APPOINTMENT (OUTPATIENT)
Dept: ORTHOPEDIC SURGERY | Facility: CLINIC | Age: 78
End: 2023-11-21
Payer: MEDICARE

## 2023-11-21 DIAGNOSIS — C49.9 MALIGNANT NEOPLASM OF CONNECTIVE AND SOFT TISSUE, UNSPECIFIED: ICD-10-CM

## 2023-11-21 PROCEDURE — 99213 OFFICE O/P EST LOW 20 MIN: CPT

## 2024-09-24 ENCOUNTER — APPOINTMENT (OUTPATIENT)
Dept: ORTHOPEDIC SURGERY | Facility: CLINIC | Age: 79
End: 2024-09-24

## 2025-01-14 ENCOUNTER — APPOINTMENT (OUTPATIENT)
Dept: ORTHOPEDIC SURGERY | Facility: CLINIC | Age: 80
End: 2025-01-14
Payer: MEDICARE

## 2025-01-14 PROCEDURE — 99214 OFFICE O/P EST MOD 30 MIN: CPT
